# Patient Record
Sex: MALE | Race: OTHER | HISPANIC OR LATINO | Employment: FULL TIME | URBAN - NONMETROPOLITAN AREA
[De-identification: names, ages, dates, MRNs, and addresses within clinical notes are randomized per-mention and may not be internally consistent; named-entity substitution may affect disease eponyms.]

---

## 2020-07-23 ENCOUNTER — HOSPITAL ENCOUNTER (EMERGENCY)
Facility: HOSPITAL | Age: 30
Discharge: HOME | End: 2020-07-23
Admitting: EMERGENCY MEDICINE

## 2020-07-23 ENCOUNTER — APPOINTMENT (OUTPATIENT)
Dept: GENERAL RADIOLOGY | Facility: HOSPITAL | Age: 30
End: 2020-07-23

## 2020-07-23 VITALS
RESPIRATION RATE: 16 BRPM | WEIGHT: 282 LBS | TEMPERATURE: 98.4 F | DIASTOLIC BLOOD PRESSURE: 103 MMHG | HEIGHT: 73 IN | OXYGEN SATURATION: 100 % | SYSTOLIC BLOOD PRESSURE: 160 MMHG | HEART RATE: 69 BPM | BODY MASS INDEX: 37.37 KG/M2

## 2020-07-23 DIAGNOSIS — J06.9 UPPER RESPIRATORY TRACT INFECTION, UNSPECIFIED TYPE: ICD-10-CM

## 2020-07-23 DIAGNOSIS — H66.92 LEFT OTITIS MEDIA, UNSPECIFIED OTITIS MEDIA TYPE: Primary | ICD-10-CM

## 2020-07-23 PROCEDURE — U0003 INFECTIOUS AGENT DETECTION BY NUCLEIC ACID (DNA OR RNA); SEVERE ACUTE RESPIRATORY SYNDROME CORONAVIRUS 2 (SARS-COV-2) (CORONAVIRUS DISEASE [COVID-19]), AMPLIFIED PROBE TECHNIQUE, MAKING USE OF HIGH THROUGHPUT TECHNOLOGIES AS DESCRIBED BY CMS-2020-01-R: HCPCS | Performed by: NURSE PRACTITIONER

## 2020-07-23 PROCEDURE — 71045 X-RAY EXAM CHEST 1 VIEW: CPT

## 2020-07-23 PROCEDURE — 99283 EMERGENCY DEPT VISIT LOW MDM: CPT

## 2020-07-23 PROCEDURE — C9803 HOPD COVID-19 SPEC COLLECT: HCPCS | Performed by: NURSE PRACTITIONER

## 2020-07-23 RX ORDER — AMOXICILLIN AND CLAVULANATE POTASSIUM 875; 125 MG/1; MG/1
1 TABLET, FILM COATED ORAL 2 TIMES DAILY
Qty: 20 TABLET | Refills: 0 | Status: SHIPPED | OUTPATIENT
Start: 2020-07-23 | End: 2020-08-02

## 2020-07-23 NOTE — ED PROVIDER NOTES
Subjective   Patient is a 29-year-old  male presents emergency department with left earache for the past 3 to 4 days.  At work he started having some shortness of breath and a mild headache as well.  He has had a nonproductive cough.  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  He denies any known exposure to COVID-19.  He states that he went to go see [employer] and they advised him to come here to emergency department to be seen. Pt denies any recent travel       History provided by:  Patient   used: No        Review of Systems   Constitutional: Negative.    HENT:        Patient is a 29-year-old  male presents emergency department with left earache for the past 3 to 4 days.  At work he started having some shortness of breath and a mild headache as well.  He has had a nonproductive cough.  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  He denies any known exposure to COVID-19.  He states that he went to go see [employer] [facility] and they advised him to come here to emergency department to be seen. Pt denies any recent travel      Eyes: Negative.    Respiratory: Negative.    Cardiovascular: Negative.    Gastrointestinal: Negative.    Endocrine: Negative.    Genitourinary: Negative.    Musculoskeletal: Negative.    Skin: Negative.    Allergic/Immunologic: Negative.    Neurological: Negative.    Hematological: Negative.    Psychiatric/Behavioral: Negative.    All other systems reviewed and are negative.      No past medical history on file.    No Known Allergies    No past surgical history on file.    No family history on file.    Social History     Socioeconomic History   • Marital status:      Spouse name: Not on file   • Number of children: Not on file   • Years of education: Not on file   • Highest education level: Not on file       Prior to Admission medications    Not on File       BP (!) 160/103 (BP Location: Left arm, Patient Position: Sitting) Comment: Kristen Girard notified of BP. Okay to  "discharge  Pulse 69   Temp 98.4 °F (36.9 °C) (Oral)   Resp 16   Ht 185.4 cm (73\")   Wt 128 kg (282 lb)   SpO2 100%   BMI 37.21 kg/m²     Objective   Physical Exam   Constitutional: He is oriented to person, place, and time. He appears well-developed and well-nourished.   Non toxic appearing. No acute distress   HENT:   Head: Normocephalic and atraumatic.   Left tm- eryth, bulging with serous drainage noted. No perforation noted. O/p sl eryth with thick clear pnd. No exudate   Eyes: Pupils are equal, round, and reactive to light. Conjunctivae and EOM are normal.   Neck: Normal range of motion. Neck supple.   Cardiovascular: Normal rate, regular rhythm, normal heart sounds and intact distal pulses.   Pulmonary/Chest: Effort normal and breath sounds normal.   Abdominal: Soft. Bowel sounds are normal.   Musculoskeletal: Normal range of motion.   Neurological: He is alert and oriented to person, place, and time. He has normal reflexes.   Skin: Skin is warm and dry.   Psychiatric: He has a normal mood and affect. His behavior is normal. Judgment and thought content normal.   Nursing note and vitals reviewed.      Procedures         Lab Results (last 24 hours)     Procedure Component Value Units Date/Time    COVID PRE-OP / PRE-PROCEDURE SCREENING ORDER (NO ISOLATION) - Swab, Oropharynx [[id number]] Collected:  07/23/20 1329    Specimen:  Swab from Oropharynx Updated:  07/23/20 1408    Narrative:       The following orders were created for panel order COVID PRE-OP / PRE-PROCEDURE SCREENING ORDER (NO ISOLATION) - Swab, Oropharynx.  Procedure                               Abnormality         Status                     ---------                               -----------         ------                     COVID-19,[facility] ROUTINE...[[id number]]                      In process                 COVID [facility] -...[[id number]]                      In process                   Please view results for these tests on the " individual orders.    COVID-19,[facility] ROUTINE, NP/OP SWAB IN TRANSPORT MEDIA OR ESWAB 72 HR TAT - Swab, Oropharynx [[id number]] Collected:  07/23/20 1329    Specimen:  Swab from Oropharynx Updated:  07/23/20 1408    COVID [facility] Priority - Swab, Oropharynx [[id number]] Collected:  07/23/20 1329    Specimen:  Swab from Oropharynx Updated:  07/23/20 1408          XR Chest 1 View   ED Interpretation   1. No radiographic evidence of acute cardiopulmonary process. No   visualized infiltrate.           This report was finalized on 07/23/2020 13:47 by Dr Stephen Hanson, .      Final Result   1. No radiographic evidence of acute cardiopulmonary process. No   visualized infiltrate.           This report was finalized on 07/23/2020 13:47 by Dr Stephen Hanson, .          ED Course  ED Course as of Jul 23 1710   Thu Jul 23, 2020   1409 Reviewed results of testing with patient.  Chest x-ray is negative for any acute disease.  Advised the patient would treat for his otitis media and advised him to stay home quarantine until he receives a negative COVID swabs.  Advised that he would receive his results in about 3 to 4 days.  Patient is in agreement with care plan.  Patient be discharged home shortly in stable condition.    [CW]      ED Course User Index  [CW] Kristen Girard, NISSA          MDM  Number of Diagnoses or Management Options  Left otitis media, unspecified otitis media type: minor  Upper respiratory tract infection, unspecified type: minor     Amount and/or Complexity of Data Reviewed  Clinical lab tests: ordered and reviewed  Tests in the radiology section of CPT®: ordered and reviewed  Independent visualization of images, tracings, or specimens: yes    Patient Progress  Patient progress: stable      Final diagnoses:   Left otitis media, unspecified otitis media type   Upper respiratory tract infection, unspecified type          Kristen Girard, APRVIVEK  07/23/20 1710

## 2020-07-23 NOTE — DISCHARGE INSTRUCTIONS
Return to ER if symptoms worsen       Ear Drops, Adult  You have been diagnosed with a condition that requires you to put drops of medicine into your ears. Ear drops are a medicine that is placed in the ear. This sheet gives you information about how to use ear drops. Your health care provider may also give you more specific instructions.  Supplies needed:  · Cotton ball.  · Medicine.  How to put ear drops into your ear    1. Wash your hands thoroughly with soap and water.  2. Make sure your ears are clean and dry. If there is any ear wax or drainage at the outermost portion of the ear canal, wipe it out gently with a cotton-tipped applicator.  3. Warm up the medicine by holding it in the palm of your hand for a few minutes.  4. Shake the medicine if it is a suspension.  5. Use the dropper to draw up the medicine.  6. Hold the dropper above your ear canal and put the drops in the affected ear as instructed. Do not put the dropper into your ear at any time. It may help to pull the outer flap of the ear up and back while you put the drops in. Doing this will straighten out the ear canal so the medicine can get into the canal easier.  7. To make sure your ear soaks up the medicine, do either of these things:  ? Lie down with the affected ear facing up for 10 minutes. This will cause the drops to stay in the ear canal and run down and fill the canal.  ? Gently put a cotton ball in your ear canal. Leave enough of the cotton ball out so it can be easily removed. Do not push the cotton ball down into your ear with a cotton-tipped swab or other instrument. You can remove the cotton ball once the medicine has been absorbed.  8. If both ears need the drops, repeat the procedure for the other ear. Your health care provider will let you know if you need to put drops in both ears.  Follow these instructions at home:  · Use the ear drops for as long as directed by your health care provider, even if you begin to feel  better.  · Always wash your hands before and after handling the ear drops.  · Keep the ear drops at room temperature.  · Keep all follow-up visits as told by your health care provider. This is important.  Contact a health care provider if:  · Your condition gets worse.  · Your pain gets worse.  · You notice any unusual drainage from your ear, especially if the drainage has a bad smell.  · You have trouble hearing.  · You have used the ear drops for the amount of time recommended by your health care provider, but your symptoms have not improved.  Get help right away if:  · You experience a form of dizziness in which you feel as if the room is spinning and you feel nauseated (vertigo).  · The outside of your ear becomes red or swollen.  · You develop a severe headache with or without neck stiffness.  Summary  · Ear drops are a medicine that is placed in the ear.  · Put drops in the affected ear as instructed.  · Use the ear drops for as long as directed by your health care provider, even if your symptoms begin to get better.  · Keep all follow-up visits as told by your health care provider. This is important.  This information is not intended to replace advice given to you by your health care provider. Make sure you discuss any questions you have with your health care provider.  Document Released: 12/12/2002 Document Revised: 11/30/2018 Document Reviewed: 12/21/2017  [employer] Patient Education © 2020 [employer]      Otitis Media, Adult    Otitis media means that the middle ear is red and swollen (inflamed) and full of fluid. The condition usually goes away on its own.  Follow these instructions at home:  · Take over-the-counter and prescription medicines only as told by your doctor.  · If you were prescribed an antibiotic medicine, take it as told by your doctor. Do not stop taking the antibiotic even if you start to feel better.  · Keep all follow-up visits as told by your doctor. This is important.  Contact a  doctor if:  · You have bleeding from your nose.  · There is a lump on your neck.  · You are not getting better in 5 days.  · You feel worse instead of better.  Get help right away if:  · You have pain that is not helped with medicine.  · You have swelling, redness, or pain around your ear.  · You get a stiff neck.  · You cannot move part of your face (paralyzed).  · You notice that the bone behind your ear hurts when you touch it.  · You get a very bad headache.  Summary  · Otitis media means that the middle ear is red, swollen, and full of fluid.  · This condition usually goes away on its own. In some cases, treatment may be needed.  · If you were prescribed an antibiotic medicine, take it as told by your doctor.  This information is not intended to replace advice given to you by your health care provider. Make sure you discuss any questions you have with your health care provider.  Document Released: 06/05/2009 Document Revised: 11/30/2018 Document Reviewed: 01/08/2018  [employer] Patient Education © 2020 [employer]             Home quarantine until have covid results

## 2020-07-24 ENCOUNTER — TELEPHONE (OUTPATIENT)
Dept: EMERGENCY DEPT | Facility: HOSPITAL | Age: 30
End: 2020-07-24

## 2020-07-24 ENCOUNTER — TELEPHONE (OUTPATIENT)
Dept: INTERNAL MEDICINE | Facility: CLINIC | Age: 30
End: 2020-07-24

## 2020-07-24 LAB
COVID LABCORP PRIORITY: NORMAL
SARS-COV-2 RNA RESP QL NAA+PROBE: NOT DETECTED

## 2020-07-24 NOTE — TELEPHONE ENCOUNTER
Follow up call from his er visit to see if he needs a Dr to establish care with for a follow-up. Patient states that he is established with Dr at [facility].

## 2020-07-24 NOTE — TELEPHONE ENCOUNTER
Called patient to advise him of negative COVID results. Patient reported he has a sore ear but is otherwise beginning to feel better. Patient requested a letter be sent to his address so he can go back to work as he is not able to drive to the ED to . Discussed that he needs to call medical records to do this. Patient with no further questions, concerns, or needs at this time.

## 2023-09-29 ENCOUNTER — PROCEDURE VISIT (OUTPATIENT)
Dept: ENT CLINIC | Age: 33
End: 2023-09-29
Payer: MEDICAID

## 2023-09-29 DIAGNOSIS — H90.12 CONDUCTIVE HEARING LOSS OF LEFT EAR WITH UNRESTRICTED HEARING OF RIGHT EAR: ICD-10-CM

## 2023-09-29 DIAGNOSIS — H72.92 PERFORATION OF LEFT TYMPANIC MEMBRANE: Primary | ICD-10-CM

## 2023-09-29 PROCEDURE — 92567 TYMPANOMETRY: CPT | Performed by: AUDIOLOGIST

## 2023-09-29 PROCEDURE — 92553 AUDIOMETRY AIR & BONE: CPT | Performed by: AUDIOLOGIST

## 2023-09-29 NOTE — PROGRESS NOTES
History   Fernando Rm is a 26 y.o. male who presented to the clinic this date with complaints of decreased hearing following traumatic injury to left TM about 5-6 months ago. He is a  and had an ember go into his left ear canal. He wears earplugs and noted about 2-3 times a year he has ear infections. He reported an echo sound in his left ear. Summary   Tympanometry consistent with normal TM mobility right. Could not seal tympanometry left due to TM perforation. Pure tone testing indicates normal hearing right and mild conductive hearing loss left. Results   Otoscopy:   Right: Clear EAC/Normal TM  Left: TM perforation    Audiometry:   Right: Hearing WNL    Left: Mild flat conductive hearing loss         Tympanometry:    Right: Type A  Left: Could not seal    Plan   Results of today's testing were discussed with Mr. Jonn Seip and the following recommendations were made: Follow up with ENT as scheduled. Recheck hearing following medical management.         Audiogram and Acoustic Immittance

## 2023-10-02 ENCOUNTER — TELEPHONE (OUTPATIENT)
Dept: ENT CLINIC | Age: 33
End: 2023-10-02

## 2023-10-02 NOTE — TELEPHONE ENCOUNTER
Lili ambriz/Dr. David Antoine office called for the results of the hearing evaluation. The fax number is [phone].

## 2023-10-04 ENCOUNTER — TELEPHONE (OUTPATIENT)
Dept: ENT CLINIC | Age: 33
End: 2023-10-04